# Patient Record
Sex: MALE | Race: BLACK OR AFRICAN AMERICAN | NOT HISPANIC OR LATINO
[De-identification: names, ages, dates, MRNs, and addresses within clinical notes are randomized per-mention and may not be internally consistent; named-entity substitution may affect disease eponyms.]

---

## 2018-06-29 PROBLEM — Z00.00 ENCOUNTER FOR PREVENTIVE HEALTH EXAMINATION: Status: ACTIVE | Noted: 2018-06-29

## 2018-07-02 ENCOUNTER — APPOINTMENT (OUTPATIENT)
Dept: MRI IMAGING | Facility: CLINIC | Age: 23
End: 2018-07-02
Payer: COMMERCIAL

## 2018-07-02 ENCOUNTER — OUTPATIENT (OUTPATIENT)
Dept: OUTPATIENT SERVICES | Facility: HOSPITAL | Age: 23
LOS: 1 days | End: 2018-07-02
Payer: COMMERCIAL

## 2018-07-02 DIAGNOSIS — Z00.8 ENCOUNTER FOR OTHER GENERAL EXAMINATION: ICD-10-CM

## 2018-07-02 PROCEDURE — 73721 MRI JNT OF LWR EXTRE W/O DYE: CPT

## 2018-07-02 PROCEDURE — 73721 MRI JNT OF LWR EXTRE W/O DYE: CPT | Mod: 26,LT

## 2020-02-09 ENCOUNTER — TRANSCRIPTION ENCOUNTER (OUTPATIENT)
Age: 25
End: 2020-02-09

## 2020-04-30 ENCOUNTER — TRANSCRIPTION ENCOUNTER (OUTPATIENT)
Age: 25
End: 2020-04-30

## 2020-05-13 ENCOUNTER — TRANSCRIPTION ENCOUNTER (OUTPATIENT)
Age: 25
End: 2020-05-13

## 2021-02-14 ENCOUNTER — EMERGENCY (EMERGENCY)
Facility: HOSPITAL | Age: 26
LOS: 1 days | Discharge: HOME | End: 2021-02-14
Attending: STUDENT IN AN ORGANIZED HEALTH CARE EDUCATION/TRAINING PROGRAM
Payer: SELF-PAY

## 2021-02-14 VITALS
DIASTOLIC BLOOD PRESSURE: 87 MMHG | WEIGHT: 216.93 LBS | RESPIRATION RATE: 18 BRPM | HEIGHT: 70 IN | OXYGEN SATURATION: 97 % | HEART RATE: 73 BPM | SYSTOLIC BLOOD PRESSURE: 152 MMHG | TEMPERATURE: 98 F

## 2021-02-14 PROCEDURE — 99284 EMERGENCY DEPT VISIT MOD MDM: CPT

## 2021-02-14 PROCEDURE — 99283 EMERGENCY DEPT VISIT LOW MDM: CPT

## 2021-02-14 RX ORDER — ACETAMINOPHEN 500 MG
650 TABLET ORAL ONCE
Refills: 0 | Status: COMPLETED | OUTPATIENT
Start: 2021-02-14 | End: 2021-02-14

## 2021-02-14 RX ADMIN — Medication 650 MILLIGRAM(S): at 09:48

## 2021-02-14 NOTE — ED ADULT NURSE REASSESSMENT NOTE - NS ED NURSE REASSESS COMMENT FT1
Motor, strength, sensation intact in upper and lower extremities, EOMI, PERRL 4R bilaterally. Passed dysphagia. VSS.

## 2021-02-14 NOTE — ED ADULT NURSE NOTE - OBJECTIVE STATEMENT
25 year old male presents ambulatory to ED through waiting room from work c/o headache. States he works at [employer] and hit the back of his head on a monitor on 2/8. States he has had a consistent 6/10 headache since, in the back of his head which radiates around the right side to right forehead. Denies LOC however states "I felt weird after". Had nausea the first few days now resolved. Having trouble sleeping. Denies any prior head injury however states he used to play football. Denies dizziness, vision changes, balance issues, chest pain, shortness of breath, abd pain, NVD, fevers, chills. Patient is awake, alert, a&ox3, following commands, strong equal strength bilaterally x 4, sensory in tact, ambulating independently with steady gait noted.

## 2021-02-14 NOTE — ED PROVIDER NOTE - CLINICAL SUMMARY MEDICAL DECISION MAKING FREE TEXT BOX
Myesha Romero): 25y M with no PMHx presents to the ED with intermittent posterior HA and difficulty concentrating, worse at night when watching TV. Of note, pt hit head on cardiac monitor on 2/8, since then pt reports intermittent symptoms. Pt reports that nausea and blurred vision have significantly improved. Here for evaluation of HA. Pt is nontoxic appearing and has increased symptoms when performing rapid eye movement. No neurological deficits. Will refer to [facility] for concussion clinic. Pt agreeable with plan. Myesha Romero): 25y M with no PMHx presents to the ED with intermittent posterior HA and difficulty concentrating, worse at night when watching TV. Of note, pt hit head on cardiac monitor on 2/8. Since then pt reports intermittent symptoms. Pt reports that nausea and blurred vision have significantly improved. Here for evaluation of HA. Pt is nontoxic appearing and has increased symptoms when performing rapid eye movement. No neurological deficits. Will refer to [facility] for concussion clinic. Pt agreeable with plan.

## 2021-02-14 NOTE — ED PROVIDER NOTE - PATIENT PORTAL LINK FT
You can access the FollowMyHealth Patient Portal offered by [facility] by registering at the following website: [email or web address]. By joining [email or web address]’s FollowMyHealth portal, you will also be able to view your health information using other applications (apps) compatible with our system.

## 2021-02-14 NOTE — ED PROVIDER NOTE - OBJECTIVE STATEMENT
25y M with no relevant PMHx presents to the ED c/o intermittent posterior HA s/p hitting head on a cardiac monitor while working at [employer] on 2/8. Pt states the HA is worse at night when watching TV and has not taken any pain medication. Pt endorsed blurred vision and nausea for the first few days which has since resolved. Denies vomiting, nausea, EtOH use.

## 2021-02-14 NOTE — ED ADULT TRIAGE NOTE - CHIEF COMPLAINT QUOTE
hit back of head on cardiac monitor on 2/8 - having continued headache. Also having trouble sleeping.   Denies LOC, dizziness, current nausea or vomiting.

## 2021-02-14 NOTE — ED PROVIDER NOTE - NEUROLOGICAL, MLM
EOMI, Pupils 4 mm reactive B/L, 5/5 upper and lower extremity strength B/L, intact sensation in B/L arms and legs, intact coordination with finger to nose B/L, 1+ brachial reflex B/L, cranial nerves 2-12 intact.

## 2021-02-14 NOTE — ED PROVIDER NOTE - NSFOLLOWUPCLINICS_GEN_ALL_ED_FT
[facility]  Neurology  [address] [place] [address] [address] [place] [address]  Phone: [phone]  Fax:   Follow Up Time:

## 2021-02-14 NOTE — ED PROVIDER NOTE - NSFOLLOWUPINSTRUCTIONS_ED_ALL_ED_FT
You were seen in the emergency department today for headache. We recommend you follow up with neurology for further management of your symptoms over the next 5-7 days. We recommend that you return if you develop worsening headache, vision changes, vomiting, weakness on one side, numbness on one side, or slurred speech

## 2021-02-26 ENCOUNTER — APPOINTMENT (OUTPATIENT)
Dept: NEUROSURGERY | Facility: CLINIC | Age: 26
End: 2021-02-26
Payer: OTHER MISCELLANEOUS

## 2021-02-26 VITALS
SYSTOLIC BLOOD PRESSURE: 136 MMHG | RESPIRATION RATE: 16 BRPM | WEIGHT: 218 LBS | HEIGHT: 70 IN | DIASTOLIC BLOOD PRESSURE: 84 MMHG | HEART RATE: 82 BPM | TEMPERATURE: 98.5 F | OXYGEN SATURATION: 99 % | BODY MASS INDEX: 31.21 KG/M2

## 2021-02-26 DIAGNOSIS — F17.210 NICOTINE DEPENDENCE, CIGARETTES, UNCOMPLICATED: ICD-10-CM

## 2021-02-26 DIAGNOSIS — S06.0X0A CONCUSSION W/OUT LOSS OF CONSCIOUSNESS, INITIAL ENCOUNTER: ICD-10-CM

## 2021-02-26 DIAGNOSIS — Z78.9 OTHER SPECIFIED HEALTH STATUS: ICD-10-CM

## 2021-02-26 DIAGNOSIS — S06.9X9A UNSPECIFIED INTRACRANIAL INJURY WITH LOSS OF CONSCIOUSNESS OF UNSPECIFIED DURATION, INITIAL ENCOUNTER: ICD-10-CM

## 2021-02-26 PROCEDURE — 99205 OFFICE O/P NEW HI 60 MIN: CPT

## 2021-02-26 PROCEDURE — 99072 ADDL SUPL MATRL&STAF TM PHE: CPT

## 2021-02-26 RX ORDER — METHYLPREDNISOLONE 4 MG/1
4 TABLET ORAL
Qty: 21 | Refills: 0 | Status: ACTIVE | COMMUNITY
Start: 2021-02-26 | End: 1900-01-01

## 2021-03-01 NOTE — ASSESSMENT
[FreeTextEntry1] : IMPRESSION:\par \par 25 years old male with work related injury, trauma to head with concussion symptoms HAs, nausea and dizziness with no neurological deficit other than mild nystagmus.\par \par \par PLAN:\par MRI Head no contrast\par Take Medrol pack as directed\par Vitamin B2 400 mg daily\par Tab Magnesium 400 mg Daily\par Encouraged Aerobic Exercises\par F/U in 3 weeks\par

## 2021-03-01 NOTE — HISTORY OF PRESENT ILLNESS
[< 3 months] : less than 3 months [FreeTextEntry1] : concussion [de-identified] : Mr. Pritchard is a 25 years old male who is here for consultation regarding a concussion. 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 He saw light and stars in his eye and had HAs.HAs got progressively worsened along with nausea, vomiting,dizziness and reported to ER on 2/14/21 and was  treated conservatively and discharged home with instruction to follow up.\par \par Today he presented to clinic walked in independently stating that he has some HAs, all day may go up to 7/10 mainly in the forehead and back of the head. He denies  vision changes, balance issue  or seizures. He reported that he vomited many times and have constant nausea. He also reports right ear tinnitus and dizziness while bending down. \par \par concussion score: 73\par 6:Trouble falling asleep.\par 5:headache, nausea,sensitivity to noise, drowsiness,emotional,difficulty remembering and concentration\par 4:vomiting, feeling slow down, feeling like in a fog\par 3:sensitivity to light, irritability, nervousness, numbness, tingling, \par 2:dizziness, balance problem, sleeping more than usual, sadness\par 1: none\par Concussion Score- 23\par A lot -Has, noise sensitivity, nausea,/vomiting,hard to fall asleep, more tired than usual, anxious, feel like a log difficulty concentrating.\par A little-Light sensitivity, neck pain,dizziness,unbalanced,sleeping more than usual,sad,confused.\par None--

## 2021-03-01 NOTE — PHYSICAL EXAM
[General Appearance - Alert] : alert [General Appearance - In No Acute Distress] : in no acute distress [General Appearance - Well Nourished] : well nourished [General Appearance - Well-Appearing] : healthy appearing [Oriented To Time, Place, And Person] : oriented to person, place, and time [Impaired Insight] : insight and judgment were intact [Affect] : the affect was normal [Memory Recent] : recent memory was not impaired [Person] : oriented to person [Place] : oriented to place [Time] : oriented to time [Short Term Intact] : short term memory intact [Cranial Nerves Optic (II)] : visual acuity intact bilaterally,  pupils equal round and reactive to light [Cranial Nerves Oculomotor (III)] : extraocular motion intact [Cranial Nerves Trigeminal (V)] : facial sensation intact symmetrically [Cranial Nerves Facial (VII)] : face symmetrical [Cranial Nerves Vestibulocochlear (VIII)] : hearing was intact bilaterally [Cranial Nerves Accessory (XI - Cranial And Spinal)] : head turning and shoulder shrug symmetric [Sensation Tactile Decrease] : light touch was intact [Sensation Pain / Temperature Decrease] : pain and temperature was intact [Balance] : balance was intact [Sclera] : the sclera and conjunctiva were normal [PERRL With Normal Accommodation] : pupils were equal in size, round, reactive to light, with normal accommodation [Outer Ear] : the ears and nose were normal in appearance [Both Tympanic Membranes Were Examined] : both tympanic membranes were normal [Neck Appearance] : the appearance of the neck was normal [] : no respiratory distress [Respiration, Rhythm And Depth] : normal respiratory rhythm and effort [Apical Impulse] : the apical impulse was normal [Heart Rate And Rhythm] : heart rate was normal and rhythm regular [Arterial Pulses Carotid] : carotid pulses were normal with no bruits [Abdominal Aorta] : the abdominal aorta was normal [Abnormal Walk] : normal gait [Musculoskeletal - Swelling] : no joint swelling seen [FreeTextEntry8] : no pronator drift, mild nystagmus on bilateral lateral gaze

## 2021-03-17 NOTE — HISTORY OF PRESENT ILLNESS
[FreeTextEntry1] : Mr. Pritchard is a 25 years old male who is here for consultation regarding a concussion. 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 He saw light and stars in his eye and had HAs.HAs got progressively worsened along with nausea, vomiting,dizziness and reported to ER on 2/14/21 and was treated conservatively and discharged home with instruction to follow up.Pt presented on 2/26/2021 and advised Medrol pack and MRI C spine. \par \par  he presented to clinic walked in independently stating that he has some HAs, all day may go up to 7/10 mainly in the forehead and back of the head. He denies vision changes, balance issue or seizures. He reported that he vomited many times and have constant nausea. He also reports right ear tinnitus and dizziness while bending down. \par \par

## 2021-03-19 ENCOUNTER — APPOINTMENT (OUTPATIENT)
Dept: NEUROSURGERY | Facility: CLINIC | Age: 26
End: 2021-03-19

## 2021-03-26 ENCOUNTER — NON-APPOINTMENT (OUTPATIENT)
Age: 26
End: 2021-03-26

## 2021-04-21 ENCOUNTER — OUTPATIENT (OUTPATIENT)
Dept: OUTPATIENT SERVICES | Facility: HOSPITAL | Age: 26
LOS: 1 days | End: 2021-04-21

## 2021-04-21 ENCOUNTER — OUTPATIENT (OUTPATIENT)
Dept: OUTPATIENT SERVICES | Facility: HOSPITAL | Age: 26
LOS: 1 days | End: 2021-04-21
Payer: COMMERCIAL

## 2021-04-21 ENCOUNTER — APPOINTMENT (OUTPATIENT)
Dept: MRI IMAGING | Facility: CLINIC | Age: 26
End: 2021-04-21
Payer: COMMERCIAL

## 2021-04-21 DIAGNOSIS — Z00.8 ENCOUNTER FOR OTHER GENERAL EXAMINATION: ICD-10-CM

## 2021-04-21 DIAGNOSIS — S06.0X0A CONCUSSION WITHOUT LOSS OF CONSCIOUSNESS, INITIAL ENCOUNTER: ICD-10-CM

## 2021-04-21 DIAGNOSIS — S06.9X9A UNSPECIFIED INTRACRANIAL INJURY WITH LOSS OF CONSCIOUSNESS OF UNSPECIFIED DURATION, INITIAL ENCOUNTER: ICD-10-CM

## 2021-04-21 PROCEDURE — 70551 MRI BRAIN STEM W/O DYE: CPT

## 2021-04-21 PROCEDURE — 70551 MRI BRAIN STEM W/O DYE: CPT | Mod: 26

## 2021-05-10 ENCOUNTER — APPOINTMENT (OUTPATIENT)
Dept: NEUROSURGERY | Facility: CLINIC | Age: 26
End: 2021-05-10
Payer: COMMERCIAL

## 2021-05-10 VITALS
DIASTOLIC BLOOD PRESSURE: 89 MMHG | HEIGHT: 70 IN | RESPIRATION RATE: 17 BRPM | BODY MASS INDEX: 31.21 KG/M2 | OXYGEN SATURATION: 98 % | WEIGHT: 218 LBS | SYSTOLIC BLOOD PRESSURE: 131 MMHG | HEART RATE: 87 BPM | TEMPERATURE: 98.3 F

## 2021-05-10 PROCEDURE — 99072 ADDL SUPL MATRL&STAF TM PHE: CPT

## 2021-05-10 PROCEDURE — 99214 OFFICE O/P EST MOD 30 MIN: CPT

## 2021-05-14 NOTE — HISTORY OF PRESENT ILLNESS
[FreeTextEntry1] : Mr. Pritchard is a 25 years old male who is here for consultation regarding a concussion. 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 He saw light and stars in his eye and had HAs.HAs got progressively worsened along with nausea, vomiting,dizziness and reported to ER on 2/14/21 and was treated conservatively and discharged home with instruction to follow up. Pt was seen in the concussion clinic on 2/26/21 and was put on Medrol dose pack and ordered MRI head. \par \par Today Mr. Pritchard  present ambulatory believes that he has improved  with his symptoms.  Denies headache, speech impairment, vision problems or seizures. He completed  the Medrol dose pack and the HA resolved. Pt  has some sleeping issue which is still a concern. Overall he is doing well and ready to go back to work. \par \par \par Concussion score: 13( last 73)\par 6: Trouble falling asleep, \par 5:none\par 4:none\par 3:none\par 2:feeling like a fog, difficulty with concentrating, difficulty with remembering\par 1: nausea\par \par \par Concussion Score- 8 last 23\par A lot -hard to fall asleep, more tired, difficulty concentrating, remembering\par A little-feeling like a fog, nausea\par None-- \par \par \par \par \par

## 2021-05-14 NOTE — RESULTS/DATA
[FreeTextEntry1] : EXAM: MR BRAIN\par \par \par PROCEDURE DATE: 04/21/2021\par \par \par \par INTERPRETATION: CLINICAL INFORMATION: Nausea, trauma to head on 2/8/2021 with concussion\par \par TECHNIQUE: Multiplanar multisequence MRI of the brain was obtained without IV contrast. A Neuroquant protocol was also performed. Age related atrophy and general morphometry reports were created and are attached to the study for review.\par \par COMPARISON: None.\par \par FINDINGS:\par \par The ventricles and sulci are normal in size.\par \par There is no intraparenchymal hematoma, mass effect or midline shift.\par \par No abnormal extra-axial fluid collections are present. There is no diffusion abnormality to suggest acute or subacute infarction. Major flow-voids at the base of the brain follow expected course and contour.\par \par The calvarium is intact. The visualized intraorbital compartments, paranasal sinuses and tympanomastoid cavities appear free of acute disease.\par \par IMPRESSION:\par \par No pathology is visualized to account for the patient's reported symptoms.\par \par

## 2021-05-14 NOTE — PHYSICAL EXAM
[General Appearance - Alert] : alert [General Appearance - In No Acute Distress] : in no acute distress [General Appearance - Well Nourished] : well nourished [General Appearance - Well-Appearing] : healthy appearing [Oriented To Time, Place, And Person] : oriented to person, place, and time [Impaired Insight] : insight and judgment were intact [Person] : oriented to person [Place] : oriented to place [Time] : oriented to time [Short Term Intact] : short term memory intact [I: Normal Smell] : smell intact bilaterally [Cranial Nerves Optic (II)] : visual acuity intact bilaterally,  pupils equal round and reactive to light [Cranial Nerves Oculomotor (III)] : extraocular motion intact [Cranial Nerves Trigeminal (V)] : facial sensation intact symmetrically [Cranial Nerves Facial (VII)] : face symmetrical [Cranial Nerves Vestibulocochlear (VIII)] : hearing was intact bilaterally [Cranial Nerves Glossopharyngeal (IX)] : tongue and palate midline [Cranial Nerves Hypoglossal (XII)] : there was no tongue deviation with protrusion [Cranial Nerves Accessory (XI - Cranial And Spinal)] : head turning and shoulder shrug symmetric [Motor Tone] : muscle tone was normal in all four extremities [Motor Strength] : muscle strength was normal in all four extremities [Sensation Tactile Decrease] : light touch was intact [Sensation Pain / Temperature Decrease] : pain and temperature was intact [Balance] : balance was intact [Sclera] : the sclera and conjunctiva were normal [PERRL With Normal Accommodation] : pupils were equal in size, round, reactive to light, with normal accommodation [Outer Ear] : the ears and nose were normal in appearance [Both Tympanic Membranes Were Examined] : both tympanic membranes were normal [Neck Appearance] : the appearance of the neck was normal [] : no respiratory distress [Respiration, Rhythm And Depth] : normal respiratory rhythm and effort [Apical Impulse] : the apical impulse was normal [Heart Rate And Rhythm] : heart rate was normal and rhythm regular [Arterial Pulses Carotid] : carotid pulses were normal with no bruits [No CVA Tenderness] : no ~M costovertebral angle tenderness [No Spinal Tenderness] : no spinal tenderness [Abnormal Walk] : normal gait [Nail Clubbing] : no clubbing  or cyanosis of the fingernails [Involuntary Movements] : no involuntary movements were seen [Musculoskeletal - Swelling] : no joint swelling seen [Skin Color & Pigmentation] : normal skin color and pigmentation [FreeTextEntry8] : no drift, no instability on tandem walk or standing.

## 2021-05-14 NOTE — ASSESSMENT
[FreeTextEntry1] : IMPRESSION:\par \par 25 years old male with work related injury, trauma to head with concussion symptoms, which got better overtime with some sleeping trouble concerns, managing with Melatonin. He feels he ready to return to work full time.\par \par \par PLAN:\par Continue Vitamin B2 400 mg daily\par Continue Tab Magnesium 400 mg Daily\par Continue Melatonin for sleep issues. \par Encouraged Aerobic Exercises\par Cleared to work FT, Note  given\par F/U in 6 weeks

## 2021-06-21 ENCOUNTER — APPOINTMENT (OUTPATIENT)
Dept: NEUROSURGERY | Facility: CLINIC | Age: 26
End: 2021-06-21

## 2022-01-21 ENCOUNTER — APPOINTMENT (OUTPATIENT)
Dept: POPULATION HEALTH | Facility: CLINIC | Age: 27
End: 2022-01-21

## 2022-09-23 ENCOUNTER — APPOINTMENT (OUTPATIENT)
Dept: INTERNAL MEDICINE | Facility: CLINIC | Age: 27
End: 2022-09-23

## 2022-09-23 VITALS
TEMPERATURE: 97.2 F | HEIGHT: 70 IN | RESPIRATION RATE: 17 BRPM | OXYGEN SATURATION: 98 % | BODY MASS INDEX: 31.21 KG/M2 | WEIGHT: 218 LBS | DIASTOLIC BLOOD PRESSURE: 82 MMHG | HEART RATE: 77 BPM | SYSTOLIC BLOOD PRESSURE: 122 MMHG

## 2022-09-23 DIAGNOSIS — K64.9 UNSPECIFIED HEMORRHOIDS: ICD-10-CM

## 2022-09-23 DIAGNOSIS — K92.1 MELENA: ICD-10-CM

## 2022-09-23 PROCEDURE — 99203 OFFICE O/P NEW LOW 30 MIN: CPT

## 2022-09-23 RX ORDER — HYDROCORTISONE 10 MG/G
1 CREAM TOPICAL
Qty: 1 | Refills: 1 | Status: ACTIVE | COMMUNITY
Start: 2022-09-23 | End: 1900-01-01

## 2022-09-28 PROBLEM — K92.1 HEMATOCHEZIA: Status: ACTIVE | Noted: 2022-09-23

## 2022-09-28 PROBLEM — K64.9 BLEEDING HEMORRHOID: Status: ACTIVE | Noted: 2022-09-23

## 2022-09-28 NOTE — HISTORY OF PRESENT ILLNESS
[FreeTextEntry1] : rectal bleeding  [de-identified] : 28 y/o male presents with concerns for rectal bleeding, intermittent blood in the stool and concerns for hemorrhoids. He feels otherwise well and offers no other acute complaints or concerns. ROS as documented below. \par

## 2022-09-28 NOTE — REVIEW OF SYSTEMS
[Fever] : no fever [Chills] : no chills [Recent Change In Weight] : ~T no recent weight change [Vision Problems] : no vision problems [Earache] : no earache [Nasal Discharge] : no nasal discharge [Sore Throat] : no sore throat [Chest Pain] : no chest pain [Palpitations] : no palpitations [Shortness Of Breath] : no shortness of breath [Wheezing] : no wheezing [Abdominal Pain] : no abdominal pain [Nausea] : no nausea [Diarrhea] : no diarrhea [Vomiting] : no vomiting [Dysuria] : no dysuria [Hesitancy] : no hesitancy [Hematuria] : no hematuria [Frequency] : no frequency [Joint Pain] : no joint pain [Joint Swelling] : no joint swelling [Skin Rash] : no skin rash [Headache] : no headache [Dizziness] : no dizziness [Anxiety] : no anxiety [Depression] : no depression [Swollen Glands] : no swollen glands [FreeTextEntry7] : rectal bleeding

## 2023-04-20 NOTE — ED ADULT TRIAGE NOTE - PAIN RATING/NUMBER SCALE (0-10): REST
6 How Many Mls Were Removed From The 40 Mg/Ml (10ml) Vial When Preparing The Injectable Solution?: 0 Treatment Number (Optional): 1 Total Volume Injected (Ccs- Only Use Numbers And Decimals): 0.5cc Bill For Wasted Drug?: no Consent: The risks of atrophy were reviewed with the patient. Ndc# For Kenalog Only: 2515-3775-20 Show Inventory Tab: Hide Medical Necessity Clause: This procedure was medically necessary because the lesions that were treated were: Administered By (Optional): Emre Grady PA-C Concentration Of Solution Injected (Mg/Ml): 3.0 Detail Level: Detailed Kenalog Preparation: Kenalog Validate Note Data When Using Inventory: Yes